# Patient Record
Sex: FEMALE | Race: WHITE
[De-identification: names, ages, dates, MRNs, and addresses within clinical notes are randomized per-mention and may not be internally consistent; named-entity substitution may affect disease eponyms.]

---

## 2017-01-12 ENCOUNTER — HOSPITAL ENCOUNTER (OUTPATIENT)
Dept: HOSPITAL 53 - M LAB | Age: 42
Discharge: HOME | End: 2017-01-12
Attending: FAMILY MEDICINE
Payer: COMMERCIAL

## 2017-01-12 DIAGNOSIS — Z13.220: ICD-10-CM

## 2017-01-12 DIAGNOSIS — Z13.0: Primary | ICD-10-CM

## 2017-01-12 DIAGNOSIS — Z13.29: ICD-10-CM

## 2017-01-12 LAB
ALBUMIN SERPL BCG-MCNC: 4.1 GM/DL (ref 3.2–5.2)
ALBUMIN/GLOB SERPL: 1.37 {RATIO} (ref 1–1.93)
ALP SERPL-CCNC: 96 U/L (ref 45–117)
ALT SERPL W P-5'-P-CCNC: 45 U/L (ref 12–78)
ANION GAP SERPL CALC-SCNC: 6 MEQ/L (ref 8–16)
AST SERPL-CCNC: 22 U/L (ref 15–37)
BASOPHILS # BLD AUTO: 0.1 K/MM3 (ref 0–0.2)
BASOPHILS NFR BLD AUTO: 1.1 % (ref 0–1)
BILIRUB SERPL-MCNC: 0.4 MG/DL (ref 0.2–1)
BUN SERPL-MCNC: 17 MG/DL (ref 7–18)
CALCIUM SERPL-MCNC: 9.1 MG/DL (ref 8.5–10.1)
CHLORIDE SERPL-SCNC: 105 MEQ/L (ref 98–107)
CHOLEST SERPL-MCNC: 318 MG/DL (ref ?–200)
CO2 SERPL-SCNC: 31 MEQ/L (ref 21–32)
CREAT SERPL-MCNC: 0.94 MG/DL (ref 0.55–1.02)
EOSINOPHIL # BLD AUTO: 0.1 K/MM3 (ref 0–0.5)
EOSINOPHIL NFR BLD AUTO: 1.9 % (ref 0–3)
ERYTHROCYTE [DISTWIDTH] IN BLOOD BY AUTOMATED COUNT: 12.8 % (ref 11.5–14.5)
GFR SERPL CREATININE-BSD FRML MDRD: > 60 ML/MIN/{1.73_M2} (ref 58–?)
GLUCOSE SERPL-MCNC: 89 MG/DL (ref 70–105)
LARGE UNSTAINED CELL #: 0.1 K/MM3 (ref 0–0.4)
LARGE UNSTAINED CELL %: 2.2 % (ref 0–4)
LYMPHOCYTES # BLD AUTO: 2.1 K/MM3 (ref 1.5–4.5)
LYMPHOCYTES NFR BLD AUTO: 31.6 % (ref 24–44)
MCH RBC QN AUTO: 30.2 PG (ref 27–33)
MCHC RBC AUTO-ENTMCNC: 33.3 G/DL (ref 32–36.5)
MCV RBC AUTO: 90.5 FL (ref 80–96)
MONOCYTES # BLD AUTO: 0.4 K/MM3 (ref 0–0.8)
MONOCYTES NFR BLD AUTO: 6.3 % (ref 0–5)
NEUTROPHILS # BLD AUTO: 3.5 K/MM3 (ref 1.8–7.7)
NEUTROPHILS NFR BLD AUTO: 56.9 % (ref 36–66)
PLATELET # BLD AUTO: 243 K/MM3 (ref 150–450)
POTASSIUM SERPL-SCNC: 4.7 MEQ/L (ref 3.5–5.1)
PROT SERPL-MCNC: 7.1 GM/DL (ref 6.4–8.2)
SODIUM SERPL-SCNC: 142 MEQ/L (ref 136–145)
T4 FREE SERPL-MCNC: 0.95 NG/DL (ref 0.76–1.46)
TRIGL SERPL-MCNC: 171 MG/DL (ref ?–150)
WBC # BLD AUTO: 6.2 K/MM3 (ref 4–10)

## 2017-02-08 ENCOUNTER — HOSPITAL ENCOUNTER (OUTPATIENT)
Dept: HOSPITAL 53 - M LAB | Age: 42
Discharge: HOME | End: 2017-02-08
Attending: FAMILY MEDICINE
Payer: COMMERCIAL

## 2017-02-08 ENCOUNTER — HOSPITAL ENCOUNTER (OUTPATIENT)
Dept: HOSPITAL 53 - M LAB | Age: 42
Discharge: HOME | End: 2017-02-08
Attending: PODIATRIST
Payer: COMMERCIAL

## 2017-02-08 DIAGNOSIS — M21.629: ICD-10-CM

## 2017-02-08 DIAGNOSIS — E78.2: Primary | ICD-10-CM

## 2017-02-08 DIAGNOSIS — Z01.818: Primary | ICD-10-CM

## 2017-02-08 DIAGNOSIS — R31.9: ICD-10-CM

## 2017-02-08 LAB
ALBUMIN SERPL BCG-MCNC: 4.2 GM/DL (ref 3.2–5.2)
ALBUMIN SERPL BCG-MCNC: 4.2 GM/DL (ref 3.2–5.2)
ALBUMIN/GLOB SERPL: 1.17 {RATIO} (ref 1–1.93)
ALBUMIN/GLOB SERPL: 1.17 {RATIO} (ref 1–1.93)
ALP SERPL-CCNC: 104 U/L (ref 45–117)
ALP SERPL-CCNC: 107 U/L (ref 45–117)
ALT SERPL W P-5'-P-CCNC: 17 U/L (ref 12–78)
ALT SERPL W P-5'-P-CCNC: 18 U/L (ref 12–78)
ANION GAP SERPL CALC-SCNC: 7 MEQ/L (ref 8–16)
ANION GAP SERPL CALC-SCNC: 7 MEQ/L (ref 8–16)
AST SERPL-CCNC: 13 U/L (ref 15–37)
AST SERPL-CCNC: 13 U/L (ref 15–37)
BACTERIA URNS QL MICRO: (no result)
BASOPHILS # BLD AUTO: 0 K/MM3 (ref 0–0.2)
BASOPHILS NFR BLD AUTO: 0.8 % (ref 0–1)
BILIRUB SERPL-MCNC: 0.5 MG/DL (ref 0.2–1)
BILIRUB SERPL-MCNC: 0.5 MG/DL (ref 0.2–1)
BUN SERPL-MCNC: 11 MG/DL (ref 7–18)
BUN SERPL-MCNC: 12 MG/DL (ref 7–18)
CALCIUM SERPL-MCNC: 9.1 MG/DL (ref 8.5–10.1)
CALCIUM SERPL-MCNC: 9.1 MG/DL (ref 8.5–10.1)
CHLORIDE SERPL-SCNC: 103 MEQ/L (ref 98–107)
CHLORIDE SERPL-SCNC: 103 MEQ/L (ref 98–107)
CHOLEST SERPL-MCNC: 211 MG/DL (ref ?–200)
CO2 SERPL-SCNC: 31 MEQ/L (ref 21–32)
CO2 SERPL-SCNC: 31 MEQ/L (ref 21–32)
CREAT SERPL-MCNC: 0.98 MG/DL (ref 0.55–1.02)
CREAT SERPL-MCNC: 0.98 MG/DL (ref 0.55–1.02)
EOSINOPHIL # BLD AUTO: 0.1 K/MM3 (ref 0–0.5)
EOSINOPHIL NFR BLD AUTO: 1.6 % (ref 0–3)
ERYTHROCYTE [DISTWIDTH] IN BLOOD BY AUTOMATED COUNT: 12 % (ref 11.5–14.5)
GFR SERPL CREATININE-BSD FRML MDRD: > 60 ML/MIN/{1.73_M2} (ref 58–?)
GFR SERPL CREATININE-BSD FRML MDRD: > 60 ML/MIN/{1.73_M2} (ref 58–?)
GLUCOSE SERPL-MCNC: 86 MG/DL (ref 70–105)
GLUCOSE SERPL-MCNC: 87 MG/DL (ref 70–105)
HYALINE CASTS URNS QL MICRO: (no result) /LPF (ref 0–1)
LARGE UNSTAINED CELL #: 0.1 K/MM3 (ref 0–0.4)
LARGE UNSTAINED CELL %: 1.8 % (ref 0–4)
LYMPHOCYTES # BLD AUTO: 2 K/MM3 (ref 1.5–4.5)
LYMPHOCYTES NFR BLD AUTO: 35.3 % (ref 24–44)
MCH RBC QN AUTO: 30.5 PG (ref 27–33)
MCHC RBC AUTO-ENTMCNC: 33.9 G/DL (ref 32–36.5)
MCV RBC AUTO: 89.9 FL (ref 80–96)
MICROSCOPIC EXAM: (no result)
MICROSCOPIC INDICATED? MAN: YES
MONOCYTES # BLD AUTO: 0.3 K/MM3 (ref 0–0.8)
MONOCYTES NFR BLD AUTO: 5.5 % (ref 0–5)
NEUTROPHILS # BLD AUTO: 3.1 K/MM3 (ref 1.8–7.7)
NEUTROPHILS NFR BLD AUTO: 54.9 % (ref 36–66)
PLATELET # BLD AUTO: 243 K/MM3 (ref 150–450)
POTASSIUM SERPL-SCNC: 4.3 MEQ/L (ref 3.5–5.1)
POTASSIUM SERPL-SCNC: 4.3 MEQ/L (ref 3.5–5.1)
PROT SERPL-MCNC: 7.8 GM/DL (ref 6.4–8.2)
PROT SERPL-MCNC: 7.8 GM/DL (ref 6.4–8.2)
RBC #/AREA URNS HPF: (no result) /HPF (ref 0–3)
SODIUM SERPL-SCNC: 141 MEQ/L (ref 136–145)
SODIUM SERPL-SCNC: 141 MEQ/L (ref 136–145)
SQUAMOUS URNS QL MICRO: (no result) /HPF
TRIGL SERPL-MCNC: 192 MG/DL (ref ?–150)
WBC # BLD AUTO: 5.6 K/MM3 (ref 4–10)
WBC #/AREA URNS HPF: (no result) /HPF (ref 0–3)

## 2017-02-13 ENCOUNTER — HOSPITAL ENCOUNTER (OUTPATIENT)
Dept: HOSPITAL 53 - M RAD | Age: 42
End: 2017-02-13
Attending: FAMILY MEDICINE
Payer: COMMERCIAL

## 2017-02-13 DIAGNOSIS — Z12.31: Primary | ICD-10-CM

## 2017-02-13 NOTE — REPMRS
Patient History

The patient states she has not had a clinical breast exam in over

a year.

Patient is postmenopausal.

Family history of colorectal cancer in mother at age 65 and 

breast cancer in maternal aunt at age 73.

Prior mammo destroyed

 

Digital Mammo Screening Bilat: February 13, 2017 - Exam #: 

[id number]

Bilateral CC and MLO view(s) were taken.

 

Technologist: Rosa Maria Campbell, Technologist

FINDINGS: There are scattered fibroglandular densities.  There is

no evidence of cancer on this mammogram.

 

ASSESSMENT: BI-RADS/ACR category 2 mammogram. Benign finding(s).

 

Recommendation

Routine screening mammogram of both breasts in 1 year (for women 

over age 40).

This mammogram was interpreted with the aid of an FDA-approved 

computer-aided dectection system.

 

Electronically Signed By: Aftab Colon MD 02/13/17 [id number]

## 2017-02-24 ENCOUNTER — HOSPITAL ENCOUNTER (OUTPATIENT)
Dept: HOSPITAL 53 - M SDC | Age: 42
End: 2017-02-24
Attending: PODIATRIST
Payer: COMMERCIAL

## 2017-02-24 VITALS — HEIGHT: 69 IN | BODY MASS INDEX: 25.09 KG/M2 | WEIGHT: 169.4 LBS

## 2017-02-24 VITALS — SYSTOLIC BLOOD PRESSURE: 109 MMHG | DIASTOLIC BLOOD PRESSURE: 58 MMHG

## 2017-02-24 DIAGNOSIS — I34.1: ICD-10-CM

## 2017-02-24 DIAGNOSIS — M20.12: Primary | ICD-10-CM

## 2017-02-24 DIAGNOSIS — R07.89: ICD-10-CM

## 2017-02-24 DIAGNOSIS — M21.622: ICD-10-CM

## 2017-02-24 DIAGNOSIS — E78.5: ICD-10-CM

## 2017-02-24 DIAGNOSIS — Z88.5: ICD-10-CM

## 2017-02-24 DIAGNOSIS — M79.672: ICD-10-CM

## 2017-02-24 DIAGNOSIS — Z79.899: ICD-10-CM

## 2017-02-24 LAB — CONTROL LINE UCG: (no result)

## 2017-02-24 PROCEDURE — 73630 X-RAY EXAM OF FOOT: CPT

## 2017-02-24 PROCEDURE — 88300 SURGICAL PATH GROSS: CPT

## 2017-02-24 PROCEDURE — 84703 CHORIONIC GONADOTROPIN ASSAY: CPT

## 2017-02-24 PROCEDURE — 85660 RBC SICKLE CELL TEST: CPT

## 2017-02-24 PROCEDURE — 28110 PART REMOVAL OF METATARSAL: CPT

## 2017-02-24 PROCEDURE — 36415 COLL VENOUS BLD VENIPUNCTURE: CPT

## 2017-02-24 PROCEDURE — 28296 COR HLX VLGS DSTL MTAR OSTEO: CPT

## 2017-02-24 NOTE — REP
Clinical:  Status post bunionectomy.

 

Technique:  AP, lateral, oblique views of the left foot.

 

Findings:

The patient is status post bunionectomy.  Open reduction and fixation at the

heads of the first and fifth metatarsal bones noted.  Satisfactory alignment

suggested.

 

Impression:

Postoperative changes involving the first and fifth metatarsal bones.

 

 

Signed by

David Hannah MD 02/24/2017 02:09 P

## 2017-05-21 ENCOUNTER — HOSPITAL ENCOUNTER (EMERGENCY)
Dept: HOSPITAL 53 - M ED | Age: 42
Discharge: HOME | End: 2017-05-21
Payer: COMMERCIAL

## 2017-05-21 VITALS — WEIGHT: 170 LBS | BODY MASS INDEX: 25.18 KG/M2 | HEIGHT: 69 IN

## 2017-05-21 VITALS — SYSTOLIC BLOOD PRESSURE: 105 MMHG | DIASTOLIC BLOOD PRESSURE: 58 MMHG

## 2017-05-21 DIAGNOSIS — S90.32XA: ICD-10-CM

## 2017-05-21 DIAGNOSIS — I47.1: ICD-10-CM

## 2017-05-21 DIAGNOSIS — R55: Primary | ICD-10-CM

## 2017-05-21 DIAGNOSIS — E78.00: ICD-10-CM
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
## 2017-05-21 DIAGNOSIS — I34.1: ICD-10-CM

## 2017-05-21 DIAGNOSIS — I10: ICD-10-CM

## 2017-05-21 LAB
ANION GAP SERPL CALC-SCNC: 5 MEQ/L (ref 8–16)
BASOPHILS # BLD AUTO: 0 K/MM3 (ref 0–0.2)
BASOPHILS NFR BLD AUTO: 0.5 % (ref 0–1)
BUN SERPL-MCNC: 11 MG/DL (ref 7–18)
CALCIUM SERPL-MCNC: 8.9 MG/DL (ref 8.5–10.1)
CHLORIDE SERPL-SCNC: 105 MEQ/L (ref 98–107)
CO2 SERPL-SCNC: 32 MEQ/L (ref 21–32)
CREAT SERPL-MCNC: 1 MG/DL (ref 0.55–1.02)
EOSINOPHIL # BLD AUTO: 0.1 K/MM3 (ref 0–0.5)
EOSINOPHIL NFR BLD AUTO: 1.7 % (ref 0–3)
ERYTHROCYTE [DISTWIDTH] IN BLOOD BY AUTOMATED COUNT: 12.3 % (ref 11.5–14.5)
GFR SERPL CREATININE-BSD FRML MDRD: > 60 ML/MIN/{1.73_M2} (ref 58–?)
GLUCOSE SERPL-MCNC: 92 MG/DL (ref 70–105)
LARGE UNSTAINED CELL #: 0.1 K/MM3 (ref 0–0.4)
LARGE UNSTAINED CELL %: 1.5 % (ref 0–4)
LYMPHOCYTES # BLD AUTO: 1.7 K/MM3 (ref 1.5–4.5)
LYMPHOCYTES NFR BLD AUTO: 23.6 % (ref 24–44)
MAGNESIUM SERPL-MCNC: 2.4 MG/DL (ref 1.8–2.4)
MCH RBC QN AUTO: 31.2 PG (ref 27–33)
MCHC RBC AUTO-ENTMCNC: 34.5 G/DL (ref 32–36.5)
MCV RBC AUTO: 90.2 FL (ref 80–96)
METHADONE UR QL SCN: NEGATIVE
MONOCYTES # BLD AUTO: 0.3 K/MM3 (ref 0–0.8)
MONOCYTES NFR BLD AUTO: 4.3 % (ref 0–5)
NEUTROPHILS # BLD AUTO: 4.7 K/MM3 (ref 1.8–7.7)
NEUTROPHILS NFR BLD AUTO: 68.4 % (ref 36–66)
PLATELET # BLD AUTO: 257 K/MM3 (ref 150–450)
POTASSIUM SERPL-SCNC: 3.8 MEQ/L (ref 3.5–5.1)
SODIUM SERPL-SCNC: 142 MEQ/L (ref 136–145)
WBC # BLD AUTO: 6.8 K/MM3 (ref 4–10)

## 2017-05-21 PROCEDURE — 82550 ASSAY OF CK (CPK): CPT

## 2017-05-21 PROCEDURE — 85025 COMPLETE CBC W/AUTO DIFF WBC: CPT

## 2017-05-21 PROCEDURE — 70450 CT HEAD/BRAIN W/O DYE: CPT

## 2017-05-21 PROCEDURE — 94760 N-INVAS EAR/PLS OXIMETRY 1: CPT

## 2017-05-21 PROCEDURE — 84443 ASSAY THYROID STIM HORMONE: CPT

## 2017-05-21 PROCEDURE — 99285 EMERGENCY DEPT VISIT HI MDM: CPT

## 2017-05-21 PROCEDURE — 93041 RHYTHM ECG TRACING: CPT

## 2017-05-21 PROCEDURE — 93005 ELECTROCARDIOGRAM TRACING: CPT

## 2017-05-21 PROCEDURE — 80306 DRUG TEST PRSMV INSTRMNT: CPT

## 2017-05-21 PROCEDURE — 81001 URINALYSIS AUTO W/SCOPE: CPT

## 2017-05-21 PROCEDURE — 83735 ASSAY OF MAGNESIUM: CPT

## 2017-05-21 PROCEDURE — 82553 CREATINE MB FRACTION: CPT

## 2017-05-21 PROCEDURE — 71010: CPT

## 2017-05-21 PROCEDURE — 73630 X-RAY EXAM OF FOOT: CPT

## 2017-05-21 PROCEDURE — 80048 BASIC METABOLIC PNL TOTAL CA: CPT

## 2017-05-21 NOTE — REP
Clinical:  Syncope .

 

Comparison: None .

 

Findings:

The ventricles, sulci, and cisterns are normal in position and appearance.

Gray-white differentiation is maintained.  No acute intracranial hemorrhage,

mass/mass effect, pathology or trauma/injury.  No evidence for acute infarction.

No extra-axial fluid collection.  Calvarium is intact.  Paranasal sinuses and

mastoid air cells are clear.

 

Impression:

 

No evidence for acute intracranial pathology or trauma/injury.

 

 

Signed by

David Hannah MD 05/21/2017 02:21 P

## 2017-05-21 NOTE — REP
Clinical:  Trauma.

 

Technique:  AP, lateral, bilateral oblique views of the left foot.

 

Comparison:  02/24/2017.

 

Findings:

The patient is again noted to be status post podiatric surgery involving the

first and fifth metatarsal bones.  Underlying osteopenia and degenerative changes

are appreciated.  No obvious acute fracture dislocation noted.

 

Impression:

No acute fracture dislocation identified.

 

 

Signed by

David Hannah MD 05/21/2017 02:39 P

## 2017-05-21 NOTE — ECGEPIP
Stationary ECG Study

                           [facility] - ED

                                       

                                       Test Date:    2017

Pat Name:     KENNETH HILL           Department:   

Patient ID:   [id number]                 Room:         -

Gender:       F                        Technician:   rn

:          1975               Requested By: Carlos JACKSON

Order Number: [id number]     Reading MD:   Keturah De Paz

                                 Measurements

Intervals                              Axis          

Rate:         70                       P:            66

MI:           153                      QRS:          15

QRSD:         81                       T:            24

QT:           398                                    

QTc:          430                                    

                           Interpretive Statements

SINUS RHYTHM

NSTTW ABNORMALITY

SIMILAR 17

Electronically Signed On 2017 20:08:38 EDT by Keturah De Paz

## 2017-05-21 NOTE — REP
Clinical:  Syncope .

 

Comparison: 10/01/2015 .

 

Technique:  PA.

 

Findings:

The mediastinum and cardiac silhouette are normal.  The lung fields are clear and

without acute consolidation, effusion, or pneumothorax.  The skeletal structures

are intact and normal.

 

Impression:

1.   No acute cardiopulmonary process.

 

 

Signed by

David Hannah MD 05/21/2017 02:40 P

## 2017-10-20 ENCOUNTER — HOSPITAL ENCOUNTER (OUTPATIENT)
Dept: HOSPITAL 53 - M RAD | Age: 42
End: 2017-10-20
Attending: PHYSICIAN ASSISTANT
Payer: COMMERCIAL

## 2017-10-20 DIAGNOSIS — J32.8: Primary | ICD-10-CM

## 2017-10-20 DIAGNOSIS — J34.2: ICD-10-CM

## 2017-10-20 NOTE — REP
MAXILLOFACIAL CT WITHOUT CONTRAST:

 

HISTORY:  Chronic sinusitis.

 

The left frontal sinus is hypoplastic. The sinuses are clear. The osteomeatal

units are patent. The middle and inferior nasal turbinates are partially

paradoxical. There is minimal deviation of the nasal septum to the left. A spur

is present arising from the left side of the nasal septum. The spur abuts the

left inferior nasal turbinate. The cribriform plate, medial walls of the orbits,

and optic canals are intact. The carotid canals form a segment of the

posterolateral walls of the sphenoid sinus. The left sphenoid sinus septum

inserts into the left internal carotid canal wall.

 

IMPRESSION:

 

There is no acute or chronic sinusitis.

 

 

Signed by

Otis Olivares MD 10/20/2017 10:30 A

## 2017-12-08 ENCOUNTER — HOSPITAL ENCOUNTER (OUTPATIENT)
Dept: HOSPITAL 53 - M SMT | Age: 42
End: 2017-12-08
Attending: PHYSICIAN ASSISTANT
Payer: COMMERCIAL

## 2017-12-08 DIAGNOSIS — Z01.818: Primary | ICD-10-CM

## 2017-12-08 LAB
ALBUMIN SERPL BCG-MCNC: 4.3 GM/DL (ref 3.2–5.2)
ALBUMIN/GLOB SERPL: 1.08 {RATIO} (ref 1–1.93)
ALP SERPL-CCNC: 94 U/L (ref 45–117)
ALT SERPL W P-5'-P-CCNC: 39 U/L (ref 12–78)
ANION GAP SERPL CALC-SCNC: 9 MEQ/L (ref 8–16)
AST SERPL-CCNC: 19 U/L (ref 7–37)
BASOPHILS # BLD AUTO: 0.1 10^3/UL (ref 0–0.2)
BASOPHILS NFR BLD AUTO: 0.7 % (ref 0–1)
BILIRUB SERPL-MCNC: 0.5 MG/DL (ref 0.2–1)
BUN SERPL-MCNC: 14 MG/DL (ref 7–18)
CALCIUM SERPL-MCNC: 9.7 MG/DL (ref 8.5–10.1)
CHLORIDE SERPL-SCNC: 102 MEQ/L (ref 98–107)
CO2 SERPL-SCNC: 30 MEQ/L (ref 21–32)
CREAT SERPL-MCNC: 0.96 MG/DL (ref 0.55–1.02)
EOSINOPHIL # BLD AUTO: 0.1 10^3/UL (ref 0–0.5)
EOSINOPHIL NFR BLD AUTO: 1.5 % (ref 0–3)
ERYTHROCYTE [DISTWIDTH] IN BLOOD BY AUTOMATED COUNT: 11.9 % (ref 11.5–14.5)
GFR SERPL CREATININE-BSD FRML MDRD: > 60 ML/MIN/{1.73_M2} (ref 58–?)
GLUCOSE SERPL-MCNC: 103 MG/DL (ref 70–105)
IMM GRANULOCYTES NFR BLD: 0.3 % (ref 0–0)
INR PPP: 0.98
LYMPHOCYTES # BLD AUTO: 2.2 10^3/UL (ref 1.5–4.5)
LYMPHOCYTES NFR BLD AUTO: 33 % (ref 24–44)
MCH RBC QN AUTO: 30.3 PG (ref 27–33)
MCHC RBC AUTO-ENTMCNC: 33.1 G/DL (ref 32–36.5)
MCV RBC AUTO: 91.6 FL (ref 80–96)
MONOCYTES # BLD AUTO: 0.5 10^3/UL (ref 0–0.8)
MONOCYTES NFR BLD AUTO: 7.9 % (ref 0–5)
NEUTROPHILS # BLD AUTO: 3.8 10^3/UL (ref 1.8–7.7)
NEUTROPHILS NFR BLD AUTO: 56.6 % (ref 36–66)
NRBC BLD AUTO-RTO: 0 % (ref 0–0)
PLATELET # BLD AUTO: 314 10^3/UL (ref 150–450)
POTASSIUM SERPL-SCNC: 4.3 MEQ/L (ref 3.5–5.1)
PROT SERPL-MCNC: 8.3 GM/DL (ref 6.4–8.2)
SODIUM SERPL-SCNC: 141 MEQ/L (ref 136–145)
WBC # BLD AUTO: 6.7 10^3/UL (ref 4–10)

## 2017-12-08 NOTE — REP
PA and lateral chest:

 

Comparison is 03/05/2009.

 

Lung fields are clear.  Cardiac size is normal.  The sherman and mediastinum are

unremarkable.

 

There is scoliosis convex left at the thoracolumbar junction, unchanged.

 

There are surgical clips in the upper abdomen.

 

There is pectus extra bottom.  This is unchanged.

 

Impression:  No significant interval change.  Pectus excavatum.  Otherwise,

essentially negative PA and lateral chest.

 

 

Signed by

Aftab Ashton MD 12/08/2017 10:42 A

## 2018-02-14 ENCOUNTER — HOSPITAL ENCOUNTER (EMERGENCY)
Dept: HOSPITAL 53 - M ED | Age: 43
Discharge: HOME | End: 2018-02-14
Payer: COMMERCIAL

## 2018-02-14 ENCOUNTER — HOSPITAL ENCOUNTER (OUTPATIENT)
Dept: HOSPITAL 53 - M SFHCLERA | Age: 43
End: 2018-02-14
Attending: NURSE PRACTITIONER
Payer: COMMERCIAL

## 2018-02-14 DIAGNOSIS — Z88.5: ICD-10-CM

## 2018-02-14 DIAGNOSIS — R11.0: Primary | ICD-10-CM

## 2018-02-14 DIAGNOSIS — Z98.890: ICD-10-CM

## 2018-02-14 DIAGNOSIS — K52.9: Primary | ICD-10-CM

## 2018-02-14 DIAGNOSIS — I88.0: ICD-10-CM

## 2018-02-14 DIAGNOSIS — Z86.79: ICD-10-CM

## 2018-02-14 DIAGNOSIS — F41.9: ICD-10-CM

## 2018-02-14 DIAGNOSIS — Z79.899: ICD-10-CM

## 2018-02-14 LAB
ANION GAP: 8 MEQ/L (ref 8–16)
BASO #: 0 10^3/UL (ref 0–0.2)
BASO %: 0.3 % (ref 0–1)
BLOOD UREA NITROGEN: 22 MG/DL (ref 7–18)
CALCIUM LEVEL: 9.5 MG/DL (ref 8.5–10.1)
CARBON DIOXIDE LEVEL: 29 MEQ/L (ref 21–32)
CHLORIDE LEVEL: 103 MEQ/L (ref 98–107)
CONTROL LINE UCG: (no result)
CREATININE FOR GFR: 0.93 MG/DL (ref 0.55–1.3)
CRP SERPL-MCNC: 1.71 MG/DL (ref 0–0.3)
EOS #: 0 10^3/UL (ref 0–0.5)
EOSINOPHIL NFR BLD AUTO: 0.3 % (ref 0–3)
GFR SERPL CREATININE-BSD FRML MDRD: > 60 ML/MIN/{1.73_M2} (ref 58–?)
GLUCOSE, FASTING: 95 MG/DL (ref 70–100)
HEMATOCRIT: 43.1 % (ref 36–47)
HEMOGLOBIN: 14.7 G/DL (ref 12–16)
IMMATURE GRANULOCYTE %: 0.2 % (ref 0–3)
LEUKOCYTE ESTERASE UR AUTO RFX: (no result)
LYMPH #: 1.1 10^3/UL (ref 1.5–4.5)
LYMPH %: 12.2 % (ref 24–44)
MEAN CORPUSCULAR HEMOGLOBIN: 30.7 PG (ref 27–33)
MEAN CORPUSCULAR HGB CONC: 34.1 G/DL (ref 32–36.5)
MEAN CORPUSCULAR VOLUME: 90 FL (ref 80–96)
MONO #: 0.4 10^3/UL (ref 0–0.8)
MONO %: 4.6 % (ref 0–5)
NEUTROPHILS #: 7.1 10^3/UL (ref 1.8–7.7)
NEUTROPHILS %: 82.4 % (ref 36–66)
NRBC BLD AUTO-RTO: 0 % (ref 0–0)
PLATELET COUNT, AUTOMATED: 260 10^3/UL (ref 150–450)
POTASSIUM SERUM: 4.5 MEQ/L (ref 3.5–5.1)
RED BLOOD COUNT: 4.79 10^6/UL (ref 4–5.4)
RED CELL DISTRIBUTION WIDTH: 12 % (ref 11.5–14.5)
SODIUM LEVEL: 140 MEQ/L (ref 136–145)
SPECIFIC GRAVITY UR AUTO RFX: 1.03 (ref 1–1.03)
SQUAM EPITHELIAL CELL UR AURFX: 1 /HPF (ref 0–6)
URINE PREG TEST: NEGATIVE
WHITE BLOOD COUNT: 8.6 10^3/UL (ref 4–10)

## 2018-02-14 RX ADMIN — ONDANSETRON 1 MG: 2 INJECTION INTRAMUSCULAR; INTRAVENOUS at 21:31

## 2018-02-14 RX ADMIN — KETOROLAC TROMETHAMINE 1 MG: 30 INJECTION, SOLUTION INTRAMUSCULAR at 21:31

## 2018-02-14 RX ADMIN — CIPROFLOXACIN HYDROCHLORIDE 1 MG: 500 TABLET, FILM COATED ORAL at 23:30

## 2018-02-14 RX ADMIN — METRONIDAZOLE 1 MG: 500 TABLET ORAL at 23:30

## 2018-02-26 ENCOUNTER — HOSPITAL ENCOUNTER (OUTPATIENT)
Dept: HOSPITAL 53 - M LAB REF | Age: 43
End: 2018-02-26
Attending: FAMILY MEDICINE
Payer: COMMERCIAL

## 2018-02-26 DIAGNOSIS — R50.9: Primary | ICD-10-CM

## 2018-02-26 LAB
FLUAV RNA UPPER RESP QL NAA+PROBE: NEGATIVE
INFLUENZA B AMPLIFICATION: NEGATIVE

## 2018-02-26 PROCEDURE — 87502 INFLUENZA DNA AMP PROBE: CPT

## 2018-03-22 ENCOUNTER — HOSPITAL ENCOUNTER (OUTPATIENT)
Dept: HOSPITAL 53 - M LAB REF | Age: 43
End: 2018-03-22
Attending: PHYSICIAN ASSISTANT
Payer: COMMERCIAL

## 2018-03-22 DIAGNOSIS — Z00.01: Primary | ICD-10-CM

## 2018-03-22 PROCEDURE — 87591 N.GONORRHOEAE DNA AMP PROB: CPT

## 2018-03-26 LAB
CHLAMYDIA DNA AMPLIFICATION: NEGATIVE
GC DNA AMPLIFICATION: NEGATIVE
HPV LOW VOL RFLX: (no result)

## 2018-04-14 ENCOUNTER — HOSPITAL ENCOUNTER (OUTPATIENT)
Dept: HOSPITAL 53 - M LAB REF | Age: 43
End: 2018-04-14
Attending: INTERNAL MEDICINE
Payer: COMMERCIAL

## 2018-04-14 DIAGNOSIS — R19.7: Primary | ICD-10-CM

## 2018-04-14 PROCEDURE — 87177 OVA AND PARASITES SMEARS: CPT

## 2018-04-16 ENCOUNTER — HOSPITAL ENCOUNTER (OUTPATIENT)
Dept: HOSPITAL 53 - M LAB | Age: 43
End: 2018-04-16
Attending: INTERNAL MEDICINE
Payer: COMMERCIAL

## 2018-04-16 DIAGNOSIS — R19.7: Primary | ICD-10-CM

## 2018-04-16 PROCEDURE — 82784 ASSAY IGA/IGD/IGG/IGM EACH: CPT

## 2018-04-27 ENCOUNTER — HOSPITAL ENCOUNTER (OUTPATIENT)
Dept: HOSPITAL 53 - M OPP | Age: 43
Discharge: HOME | End: 2018-04-27
Attending: INTERNAL MEDICINE
Payer: COMMERCIAL

## 2018-04-27 DIAGNOSIS — R19.7: ICD-10-CM

## 2018-04-27 DIAGNOSIS — K64.8: ICD-10-CM

## 2018-04-27 DIAGNOSIS — K52.9: ICD-10-CM

## 2018-04-27 DIAGNOSIS — R07.89: ICD-10-CM

## 2018-04-27 DIAGNOSIS — Z88.5: ICD-10-CM

## 2018-04-27 DIAGNOSIS — Z86.79: ICD-10-CM

## 2018-04-27 DIAGNOSIS — Z79.899: ICD-10-CM

## 2018-04-27 DIAGNOSIS — I34.1: ICD-10-CM

## 2018-04-27 DIAGNOSIS — Z80.3: ICD-10-CM

## 2018-04-27 DIAGNOSIS — Z80.0: ICD-10-CM

## 2018-04-27 DIAGNOSIS — K92.1: ICD-10-CM

## 2018-04-27 DIAGNOSIS — G43.909: ICD-10-CM

## 2018-04-27 DIAGNOSIS — D64.9: ICD-10-CM

## 2018-04-27 DIAGNOSIS — K62.1: ICD-10-CM

## 2018-04-27 DIAGNOSIS — K63.89: ICD-10-CM

## 2018-04-27 DIAGNOSIS — K21.9: ICD-10-CM

## 2018-04-27 DIAGNOSIS — R93.3: Primary | ICD-10-CM

## 2018-04-27 DIAGNOSIS — R00.8: ICD-10-CM

## 2018-04-27 DIAGNOSIS — R06.02: ICD-10-CM

## 2018-04-27 PROCEDURE — 45380 COLONOSCOPY AND BIOPSY: CPT

## 2018-04-27 RX ADMIN — SODIUM CHLORIDE 1 MLS/HR: 9 INJECTION, SOLUTION INTRAVENOUS at 08:15

## 2018-06-19 ENCOUNTER — HOSPITAL ENCOUNTER (OUTPATIENT)
Dept: HOSPITAL 53 - M SFHCLERA | Age: 43
End: 2018-06-19
Attending: NURSE PRACTITIONER
Payer: COMMERCIAL

## 2018-06-19 DIAGNOSIS — J02.9: Primary | ICD-10-CM

## 2018-06-19 PROCEDURE — 87880 STREP A ASSAY W/OPTIC: CPT

## 2018-07-03 ENCOUNTER — HOSPITAL ENCOUNTER (OUTPATIENT)
Dept: HOSPITAL 53 - M LRY | Age: 43
End: 2018-07-03
Attending: PHYSICIAN ASSISTANT
Payer: COMMERCIAL

## 2018-07-03 DIAGNOSIS — R05: Primary | ICD-10-CM

## 2018-07-03 PROCEDURE — 71046 X-RAY EXAM CHEST 2 VIEWS: CPT

## 2019-11-23 ENCOUNTER — HOSPITAL ENCOUNTER (EMERGENCY)
Dept: HOSPITAL 53 - M ED | Age: 44
Discharge: HOME | End: 2019-11-23
Payer: COMMERCIAL

## 2019-11-23 VITALS — SYSTOLIC BLOOD PRESSURE: 109 MMHG | DIASTOLIC BLOOD PRESSURE: 66 MMHG

## 2019-11-23 VITALS — BODY MASS INDEX: 26.72 KG/M2 | HEIGHT: 69 IN | WEIGHT: 180.38 LBS

## 2019-11-23 DIAGNOSIS — Z79.51: ICD-10-CM

## 2019-11-23 DIAGNOSIS — Z86.79: ICD-10-CM

## 2019-11-23 DIAGNOSIS — E78.5: ICD-10-CM

## 2019-11-23 DIAGNOSIS — I10: ICD-10-CM

## 2019-11-23 DIAGNOSIS — R07.89: Primary | ICD-10-CM

## 2019-11-23 DIAGNOSIS — Z88.5: ICD-10-CM

## 2019-11-23 DIAGNOSIS — R94.31: ICD-10-CM

## 2019-11-23 DIAGNOSIS — Z79.899: ICD-10-CM

## 2019-11-23 LAB
ALBUMIN SERPL BCG-MCNC: 4.1 GM/DL (ref 3.2–5.2)
ALT SERPL W P-5'-P-CCNC: 22 U/L (ref 12–78)
BASOPHILS # BLD AUTO: 0 10^3/UL (ref 0–0.2)
BASOPHILS NFR BLD AUTO: 0.7 % (ref 0–1)
BILIRUB CONJ SERPL-MCNC: 0.1 MG/DL (ref 0–0.2)
BILIRUB SERPL-MCNC: 0.6 MG/DL (ref 0.2–1)
BUN SERPL-MCNC: 9 MG/DL (ref 7–18)
CALCIUM SERPL-MCNC: 9.3 MG/DL (ref 8.5–10.1)
CHLORIDE SERPL-SCNC: 108 MEQ/L (ref 98–107)
CK MB CFR.DF SERPL CALC: 1.08
CK MB SERPL-MCNC: < 1 NG/ML (ref ?–3.6)
CK SERPL-CCNC: 93 U/L (ref 26–192)
CO2 SERPL-SCNC: 25 MEQ/L (ref 21–32)
CREAT SERPL-MCNC: 1.12 MG/DL (ref 0.55–1.3)
EOSINOPHIL # BLD AUTO: 0.1 10^3/UL (ref 0–0.5)
EOSINOPHIL NFR BLD AUTO: 0.9 % (ref 0–3)
FLUAV RNA UPPER RESP QL NAA+PROBE: NEGATIVE
FLUBV RNA UPPER RESP QL NAA+PROBE: NEGATIVE
GFR SERPL CREATININE-BSD FRML MDRD: 56.3 ML/MIN/{1.73_M2} (ref 58–?)
GLUCOSE SERPL-MCNC: 98 MG/DL (ref 70–100)
HCT VFR BLD AUTO: 42.1 % (ref 36–47)
HGB BLD-MCNC: 13.9 G/DL (ref 12–15.5)
LIPASE SERPL-CCNC: 122 U/L (ref 73–393)
LYMPHOCYTES # BLD AUTO: 1.4 10^3/UL (ref 1.5–5)
LYMPHOCYTES NFR BLD AUTO: 25.3 % (ref 24–44)
MCH RBC QN AUTO: 30.5 PG (ref 27–33)
MCHC RBC AUTO-ENTMCNC: 33 G/DL (ref 32–36.5)
MCV RBC AUTO: 92.5 FL (ref 80–96)
MONOCYTES # BLD AUTO: 0.7 10^3/UL (ref 0–0.8)
MONOCYTES NFR BLD AUTO: 11.7 % (ref 0–5)
NEUTROPHILS # BLD AUTO: 3.4 10^3/UL (ref 1.5–8.5)
NEUTROPHILS NFR BLD AUTO: 61 % (ref 36–66)
PLATELET # BLD AUTO: 243 10^3/UL (ref 150–450)
POTASSIUM SERPL-SCNC: 3.8 MEQ/L (ref 3.5–5.1)
PROT SERPL-MCNC: 7.3 GM/DL (ref 6.4–8.2)
RBC # BLD AUTO: 4.55 10^6/UL (ref 4–5.4)
SODIUM SERPL-SCNC: 141 MEQ/L (ref 136–145)
TROPONIN I SERPL-MCNC: < 0.02 NG/ML (ref ?–0.1)
WBC # BLD AUTO: 5.6 10^3/UL (ref 4–10)

## 2019-11-24 NOTE — ED PDOC
Post-Departure Follow-Up


dr leo carrizales faxed foraml report of cxr for fu lurdesg











Lundborg-Gray,Maja MD          Nov 24, 2019 19:54

## 2019-11-24 NOTE — REP
REASON FOR EXAM:  Chest pain.

 

COMPARISON:  Multiple, the latest 07/03/2018.

 

The technique utilized in obtaining the radiograph has magnified the cardiac

silhouette and accentuated the interstitial markings.

 

There is mild cardiomegaly accentuated by technique.  The lung fields are clear

and the pleural angles are sharp.  The osseous structures are within normal

limits.

 

IMPRESSION:

 

Mild  cardiomegaly accentuated by technique.

 

 

Electronically Signed by

Yovanny Morris DO 11/24/2019 09:22 A

## 2019-11-24 NOTE — ECGEPIP
[facility] - ED

                                       

                                       Test Date:    2019

Pat Name:     KENNETH HILL           Department:   

Patient ID:   [id number]                 Room:         -

Gender:       Female                   Technician:   JEMIMA

:          1975               Requested By: Carlos JACKSON

Order Number: [id number]     Reading MD:   Maja Lundborg-Gray

                                 Measurements

Intervals                              Axis          

Rate:         65                       P:            66

MA:           150                      QRS:          10

QRSD:         88                       T:            36

QT:           405                                    

QTc:          424                                    

                           Interpretive Statements

SINUS RHYTHM

NONSPECIFIC ST T WAVE CHANGES

 

CW 17 RATE DECREASED

NONSPECIFIC ST T WAVE CHANGES

Electronically Signed on 2019 19:11:42 EST by Maja Lundborg-Gray

## 2019-12-01 ENCOUNTER — HOSPITAL ENCOUNTER (OUTPATIENT)
Dept: HOSPITAL 53 - M LRY | Age: 44
End: 2019-12-01
Attending: PHYSICIAN ASSISTANT
Payer: COMMERCIAL

## 2019-12-01 DIAGNOSIS — R50.9: ICD-10-CM

## 2019-12-01 DIAGNOSIS — R05: Primary | ICD-10-CM

## 2019-12-01 PROCEDURE — 94640 AIRWAY INHALATION TREATMENT: CPT

## 2019-12-01 PROCEDURE — 71046 X-RAY EXAM CHEST 2 VIEWS: CPT

## 2019-12-01 NOTE — REP
Clinical:  Cough and fever .

 

Comparison: 12/08/2017 .

 

Technique:  PA and lateral.

 

Findings:

The mediastinum and cardiac silhouette are normal.  The lung fields are clear and

without acute consolidation, effusion, or pneumothorax.  The skeletal structures

are intact and normal.

 

Impression:

1.   No acute cardiopulmonary process.

 

 

Electronically Signed by

David Hannah MD 12/01/2019 01:50 P

## 2020-04-30 ENCOUNTER — HOSPITAL ENCOUNTER (OUTPATIENT)
Dept: HOSPITAL 53 - M WUC | Age: 45
End: 2020-04-30
Attending: PHYSICIAN ASSISTANT
Payer: COMMERCIAL

## 2020-04-30 DIAGNOSIS — R10.9: Primary | ICD-10-CM

## 2020-04-30 LAB
ALBUMIN SERPL BCG-MCNC: 4.4 GM/DL (ref 3.2–5.2)
ALT SERPL W P-5'-P-CCNC: 25 U/L (ref 12–78)
BASOPHILS # BLD AUTO: 0 10^3/UL (ref 0–0.2)
BASOPHILS NFR BLD AUTO: 0.5 % (ref 0–1)
BILIRUB SERPL-MCNC: 0.6 MG/DL (ref 0.2–1)
BUN SERPL-MCNC: 12 MG/DL (ref 7–18)
CALCIUM SERPL-MCNC: 9.3 MG/DL (ref 8.5–10.1)
CHLORIDE SERPL-SCNC: 106 MEQ/L (ref 98–107)
CO2 SERPL-SCNC: 31 MEQ/L (ref 21–32)
CREAT SERPL-MCNC: 1.02 MG/DL (ref 0.55–1.3)
EOSINOPHIL # BLD AUTO: 0.1 10^3/UL (ref 0–0.5)
EOSINOPHIL NFR BLD AUTO: 1.1 % (ref 0–3)
GFR SERPL CREATININE-BSD FRML MDRD: > 60 ML/MIN/{1.73_M2} (ref 58–?)
GLUCOSE SERPL-MCNC: 83 MG/DL (ref 70–100)
HCT VFR BLD AUTO: 44.3 % (ref 36–47)
HGB BLD-MCNC: 14.4 G/DL (ref 12–15.5)
LIPASE SERPL-CCNC: 139 U/L (ref 73–393)
LYMPHOCYTES # BLD AUTO: 3.5 10^3/UL (ref 1.5–5)
LYMPHOCYTES NFR BLD AUTO: 46.2 % (ref 24–44)
MCH RBC QN AUTO: 29.9 PG (ref 27–33)
MCHC RBC AUTO-ENTMCNC: 32.5 G/DL (ref 32–36.5)
MCV RBC AUTO: 92.1 FL (ref 80–96)
MONOCYTES # BLD AUTO: 0.6 10^3/UL (ref 0–0.8)
MONOCYTES NFR BLD AUTO: 7.5 % (ref 0–5)
NEUTROPHILS # BLD AUTO: 3.3 10^3/UL (ref 1.5–8.5)
NEUTROPHILS NFR BLD AUTO: 44.4 % (ref 36–66)
PLATELET # BLD AUTO: 296 10^3/UL (ref 150–450)
POTASSIUM SERPL-SCNC: 4 MEQ/L (ref 3.5–5.1)
PROT SERPL-MCNC: 7.6 GM/DL (ref 6.4–8.2)
RBC # BLD AUTO: 4.81 10^6/UL (ref 4–5.4)
SODIUM SERPL-SCNC: 141 MEQ/L (ref 136–145)
WBC # BLD AUTO: 7.5 10^3/UL (ref 4–10)

## 2021-04-08 ENCOUNTER — HOSPITAL ENCOUNTER (OUTPATIENT)
Dept: HOSPITAL 53 - M PLALAB | Age: 46
End: 2021-04-08
Attending: PHYSICIAN ASSISTANT
Payer: COMMERCIAL

## 2021-04-08 DIAGNOSIS — I47.1: Primary | ICD-10-CM

## 2021-04-08 DIAGNOSIS — E78.00: ICD-10-CM

## 2021-04-08 DIAGNOSIS — E87.6: ICD-10-CM

## 2021-04-08 LAB
ALBUMIN SERPL BCG-MCNC: 4.1 GM/DL (ref 3.2–5.2)
ALT SERPL W P-5'-P-CCNC: 40 U/L (ref 12–78)
BILIRUB SERPL-MCNC: 0.5 MG/DL (ref 0.2–1)
BUN SERPL-MCNC: 19 MG/DL (ref 7–18)
CALCIUM SERPL-MCNC: 9.9 MG/DL (ref 8.5–10.1)
CHLORIDE SERPL-SCNC: 107 MEQ/L (ref 98–107)
CHOLEST SERPL-MCNC: 364 MG/DL (ref ?–200)
CHOLEST/HDLC SERPL: 7.74 {RATIO} (ref ?–5)
CO2 SERPL-SCNC: 30 MEQ/L (ref 21–32)
CREAT SERPL-MCNC: 0.98 MG/DL (ref 0.55–1.3)
GFR SERPL CREATININE-BSD FRML MDRD: > 60 ML/MIN/{1.73_M2} (ref 58–?)
GLUCOSE SERPL-MCNC: 96 MG/DL (ref 70–100)
HDLC SERPL-MCNC: 47 MG/DL (ref 40–?)
LDLC SERPL CALC-MCNC: 281 MG/DL (ref ?–100)
MAGNESIUM SERPL-MCNC: 2.3 MG/DL (ref 1.8–2.4)
NONHDLC SERPL-MCNC: 317 MG/DL
POTASSIUM SERPL-SCNC: 4.5 MEQ/L (ref 3.5–5.1)
PROT SERPL-MCNC: 7.3 GM/DL (ref 6.4–8.2)
SODIUM SERPL-SCNC: 140 MEQ/L (ref 136–145)
TRIGL SERPL-MCNC: 179 MG/DL (ref ?–150)

## 2021-04-24 ENCOUNTER — HOSPITAL ENCOUNTER (OUTPATIENT)
Dept: HOSPITAL 53 - M LAB REF | Age: 46
End: 2021-04-24
Attending: PHYSICIAN ASSISTANT
Payer: COMMERCIAL

## 2021-04-24 ENCOUNTER — HOSPITAL ENCOUNTER (OUTPATIENT)
Dept: HOSPITAL 53 - M LAB | Age: 46
End: 2021-04-24
Attending: PHYSICIAN ASSISTANT
Payer: COMMERCIAL

## 2021-04-24 DIAGNOSIS — R10.32: Primary | ICD-10-CM

## 2021-04-24 DIAGNOSIS — R10.9: Primary | ICD-10-CM

## 2021-04-24 LAB
ALBUMIN SERPL BCG-MCNC: 4.1 GM/DL (ref 3.2–5.2)
ALT SERPL W P-5'-P-CCNC: 22 U/L (ref 12–78)
BASOPHILS # BLD AUTO: 0 10^3/UL (ref 0–0.2)
BASOPHILS NFR BLD AUTO: 0.6 % (ref 0–1)
BILIRUB SERPL-MCNC: 0.4 MG/DL (ref 0.2–1)
BUN SERPL-MCNC: 18 MG/DL (ref 7–18)
CALCIUM SERPL-MCNC: 9.4 MG/DL (ref 8.5–10.1)
CHLORIDE SERPL-SCNC: 107 MEQ/L (ref 98–107)
CO2 SERPL-SCNC: 30 MEQ/L (ref 21–32)
CREAT SERPL-MCNC: 0.99 MG/DL (ref 0.55–1.3)
CRP SERPL-MCNC: 1.01 MG/DL (ref 0–0.3)
EOSINOPHIL # BLD AUTO: 0.1 10^3/UL (ref 0–0.5)
EOSINOPHIL NFR BLD AUTO: 1.1 % (ref 0–3)
ERYTHROCYTE [SEDIMENTATION RATE] IN BLOOD BY WESTERGREN METHOD: 20 MM/HR (ref 0–20)
GFR SERPL CREATININE-BSD FRML MDRD: > 60 ML/MIN/{1.73_M2} (ref 58–?)
GLUCOSE SERPL-MCNC: 101 MG/DL (ref 70–100)
HCT VFR BLD AUTO: 41.2 % (ref 36–47)
HGB BLD-MCNC: 13.6 G/DL (ref 12–15.5)
LYMPHOCYTES # BLD AUTO: 2.4 10^3/UL (ref 1.5–5)
LYMPHOCYTES NFR BLD AUTO: 34.5 % (ref 24–44)
MCH RBC QN AUTO: 30.3 PG (ref 27–33)
MCHC RBC AUTO-ENTMCNC: 33 G/DL (ref 32–36.5)
MCV RBC AUTO: 91.8 FL (ref 80–96)
MONOCYTES # BLD AUTO: 0.6 10^3/UL (ref 0–0.8)
MONOCYTES NFR BLD AUTO: 8.5 % (ref 2–8)
NEUTROPHILS # BLD AUTO: 3.9 10^3/UL (ref 1.5–8.5)
NEUTROPHILS NFR BLD AUTO: 54.7 % (ref 36–66)
PLATELET # BLD AUTO: 259 10^3/UL (ref 150–450)
POTASSIUM SERPL-SCNC: 4.2 MEQ/L (ref 3.5–5.1)
PROT SERPL-MCNC: 7.1 GM/DL (ref 6.4–8.2)
RBC # BLD AUTO: 4.49 10^6/UL (ref 4–5.4)
SODIUM SERPL-SCNC: 140 MEQ/L (ref 136–145)
WBC # BLD AUTO: 7.1 10^3/UL (ref 4–10)

## 2021-09-09 ENCOUNTER — HOSPITAL ENCOUNTER (OUTPATIENT)
Dept: HOSPITAL 53 - M LAB | Age: 46
End: 2021-09-09
Attending: INTERNAL MEDICINE
Payer: COMMERCIAL

## 2021-09-09 DIAGNOSIS — K92.2: Primary | ICD-10-CM

## 2021-09-09 LAB
BUN SERPL-MCNC: 10 MG/DL (ref 7–18)
CREAT SERPL-MCNC: 0.94 MG/DL (ref 0.55–1.3)
CRP SERPL-MCNC: 0.43 MG/DL (ref 0–0.3)
ERYTHROCYTE [SEDIMENTATION RATE] IN BLOOD BY WESTERGREN METHOD: 15 MM/HR (ref 0–20)
GFR SERPL CREATININE-BSD FRML MDRD: > 60 ML/MIN/{1.73_M2} (ref 58–?)
HCT VFR BLD AUTO: 44 % (ref 36–47)
HGB BLD-MCNC: 14.2 G/DL (ref 12–15.5)
IRON SATN MFR SERPL: 103 % (ref 13.2–45)
IRON SERPL-MCNC: 138 UG/DL (ref 50–170)
MCH RBC QN AUTO: 30 PG (ref 27–33)
MCHC RBC AUTO-ENTMCNC: 32.3 G/DL (ref 32–36.5)
MCV RBC AUTO: 93 FL (ref 80–96)
PLATELET # BLD AUTO: 251 10^3/UL (ref 150–450)
RBC # BLD AUTO: 4.73 10^6/UL (ref 4–5.4)
TIBC SERPL-MCNC: 134 UG/DL (ref 250–450)
WBC # BLD AUTO: 6.1 10^3/UL (ref 4–10)

## 2021-09-29 ENCOUNTER — HOSPITAL ENCOUNTER (OUTPATIENT)
Dept: HOSPITAL 53 - M LABSMTC | Age: 46
End: 2021-09-29
Attending: ANESTHESIOLOGY
Payer: COMMERCIAL

## 2021-09-29 DIAGNOSIS — Z01.812: Primary | ICD-10-CM

## 2021-09-29 DIAGNOSIS — Z20.822: ICD-10-CM

## 2021-10-04 ENCOUNTER — HOSPITAL ENCOUNTER (OUTPATIENT)
Dept: HOSPITAL 53 - M OPP | Age: 46
Discharge: HOME | End: 2021-10-04
Attending: INTERNAL MEDICINE
Payer: COMMERCIAL

## 2021-10-04 VITALS — BODY MASS INDEX: 26.63 KG/M2 | WEIGHT: 179.8 LBS | HEIGHT: 69 IN

## 2021-10-04 VITALS — SYSTOLIC BLOOD PRESSURE: 111 MMHG | DIASTOLIC BLOOD PRESSURE: 57 MMHG

## 2021-10-04 DIAGNOSIS — Z79.899: ICD-10-CM

## 2021-10-04 DIAGNOSIS — K52.9: Primary | ICD-10-CM

## 2021-10-04 DIAGNOSIS — K92.1: ICD-10-CM

## 2021-10-04 DIAGNOSIS — Z80.0: ICD-10-CM

## 2021-10-04 DIAGNOSIS — Z88.5: ICD-10-CM

## 2021-10-04 DIAGNOSIS — K64.8: ICD-10-CM

## 2021-10-04 PROCEDURE — 88305 TISSUE EXAM BY PATHOLOGIST: CPT

## 2021-10-04 PROCEDURE — 45380 COLONOSCOPY AND BIOPSY: CPT

## 2021-10-04 NOTE — ROOR
________________________________________________________________________________

Patient Name: Jazmín Washington           Procedure Date: 10/4/2021 11:16 AM

MRN: [id number]                          Account Number: [id number]

YOB: 1975               Age: 46

Room: [facility]                            Gender: Female

Note Status: Finalized                 

________________________________________________________________________________

 

Procedure:            Colonoscopy

Indications:          Chronic diarrhea, Hematochezia

Providers:            Efra Guaman MD

Referring MD:         Gypsy CASON DO

Requesting Provider:  Gypsy CASON DO

Medicines:            Monitored Anesthesia Care

Complications:        No immediate complications.

________________________________________________________________________________

Procedure:            Pre-Anesthesia Assessment:

                      - Prior to the procedure, a History and Physical was 

                      performed, and patient medications and allergies were 

                      reviewed. The patient is competent. The risks and 

                      benefits of the procedure and the sedation options and 

                      risks were discussed with the patient. All questions 

                      were answered and informed consent was obtained. Patient 

                      identification and proposed procedure were verified by 

                      the physician, the nurse and the anesthesiologist in the 

                      procedure room. Mental Status Examination: alert and 

                      oriented. Airway Examination: normal oropharyngeal 

                      airway and neck mobility. Respiratory Examination: clear 

                      to auscultation. CV Examination: normal. Prophylactic 

                      Antibiotics: The patient does not require prophylactic 

                      antibiotics. Prior Anticoagulants: The patient has taken 

                      no previous anticoagulant or antiplatelet agents. ASA 

                      Grade Assessment: II - A patient with mild systemic 

                      disease. After reviewing the risks and benefits, the 

                      patient was deemed in satisfactory condition to undergo 

                      the procedure. The anesthesia plan was to use monitored 

                      anesthesia care (MAC). Immediately prior to 

                      administration of medications, the patient was 

                      re-assessed for adequacy to receive sedatives. The heart 

                      rate, respiratory rate, oxygen saturations, blood 

                      pressure, adequacy of pulmonary ventilation, and 

                      response to care were monitored throughout the 

                      procedure. The physical status of the patient was 

                      re-assessed after the procedure.

                      The Colonoscope was introduced through the anus and 

                      advanced to the terminal ileum, with identification of 

                      the appendiceal orifice and IC valve. The colonoscopy 

                      was technically difficult and complex due to restricted 

                      mobility of the colon. Successful completion of the 

                      procedure was aided by withdrawing the scope and 

                      replacing with the enteroscope. The patient tolerated 

                      the procedure well. The quality of the bowel preparation 

                      was good. The terminal ileum, ileocecal valve, 

                      appendiceal orifice, and rectum were photographed. Scope 

                      insertion time was 5 minutes. Scope withdrawal time was 

                      9 minutes. The total duration of the procedure was 14 

                      minutes.

                                                                                

Findings:

     The perianal and digital rectal examinations were normal.

     The terminal ileum appeared normal.

     Normal mucosa was found in the entire colon. Biopsies for histology were 

     taken with a cold forceps from the right colon, left colon, transverse 

     colon and rectosigmoid colon for evaluation of microscopic colitis. 

     Verification of patient identification for the specimen was done by the 

     physician and nurse using the patient's name, birth date and medical 

     record number. Estimated blood loss was minimal.

     Non-bleeding external and internal hemorrhoids were found during 

     retroflexion. The hemorrhoids were medium-sized.

                                                                                

Impression:           - The examined portion of the ileum was normal.

                      - Normal mucosa in the entire examined colon. Biopsied.

                      - Non-bleeding external and internal hemorrhoids.

Recommendation:       - Patient has a contact number available for 

                      emergencies. The signs and symptoms of potential delayed 

                      complications were discussed with the patient. Return to 

                      normal activities tomorrow. Written discharge 

                      instructions were provided to the patient.

                      - High fiber diet.

                      - Continue present medications.

                      - Use fiber, for example Citrucel, Fibercon, Konsyl or 

                      Metamucil. and topic preparation H for hemorrhoids.

                      - Await pathology results.

                      - Repeat colonoscopy in 10 years for screening purposes.

                      - Telephone GI clinic for pathology results in 2 weeks.

                      - Based on the biopsies will consider anti-spasmodic 

                      therapy.

                      - Return to GI clinic if persistent symptoms or new 

                      symptoms.

                      - Return to primary care physician.

                                                                                

Procedure Code(s):    --- Professional ---

                      89109, Colonoscopy, flexible; with biopsy, single or 

                      multiple

Diagnosis Code(s):    --- Professional ---

                      K64.8, Other hemorrhoids

                      K52.9, Noninfective gastroenteritis and colitis, 

                      unspecified

                      K92.1, Melena (includes Hematochezia)

 

CPT copyright 2019 [employer]. All rights reserved.

 

The codes documented in this report are preliminary and upon  review may 

be revised to meet current compliance requirements.

 

Efra Guaman MD

_______________________

Efra Guaman MD

10/4/2021 12:17:10 PM

Electronically signed by Efra Guaman MD

Number of Addenda: 0

 

Note Initiated On: 10/4/2021 11:16 AM

Estimated Blood Loss: Estimated blood loss was minimal.

## 2022-03-02 ENCOUNTER — HOSPITAL ENCOUNTER (OUTPATIENT)
Dept: HOSPITAL 53 - M WUC | Age: 47
End: 2022-03-02
Attending: PHYSICIAN ASSISTANT
Payer: COMMERCIAL

## 2022-03-02 DIAGNOSIS — R06.02: Primary | ICD-10-CM

## 2022-03-02 LAB
BASOPHILS # BLD AUTO: 0 10^3/UL (ref 0–0.2)
BASOPHILS NFR BLD AUTO: 0.6 % (ref 0–1)
BUN SERPL-MCNC: 14 MG/DL (ref 7–18)
CALCIUM SERPL-MCNC: 9.8 MG/DL (ref 8.5–10.1)
CHLORIDE SERPL-SCNC: 105 MEQ/L (ref 98–107)
CO2 SERPL-SCNC: 31 MEQ/L (ref 21–32)
CREAT SERPL-MCNC: 1.07 MG/DL (ref 0.55–1.3)
EOSINOPHIL # BLD AUTO: 0.1 10^3/UL (ref 0–0.5)
EOSINOPHIL NFR BLD AUTO: 2 % (ref 0–3)
GFR SERPL CREATININE-BSD FRML MDRD: 58.8 ML/MIN/{1.73_M2} (ref 58–?)
GLUCOSE SERPL-MCNC: 97 MG/DL (ref 70–100)
HCT VFR BLD AUTO: 42.6 % (ref 36–47)
HGB BLD-MCNC: 13.8 G/DL (ref 12–15.5)
LYMPHOCYTES # BLD AUTO: 2.2 10^3/UL (ref 1.5–5)
LYMPHOCYTES NFR BLD AUTO: 32.3 % (ref 24–44)
MCH RBC QN AUTO: 30.1 PG (ref 27–33)
MCHC RBC AUTO-ENTMCNC: 32.4 G/DL (ref 32–36.5)
MCV RBC AUTO: 92.8 FL (ref 80–96)
MONOCYTES # BLD AUTO: 0.6 10^3/UL (ref 0–0.8)
MONOCYTES NFR BLD AUTO: 8.2 % (ref 2–8)
NEUTROPHILS # BLD AUTO: 3.9 10^3/UL (ref 1.5–8.5)
NEUTROPHILS NFR BLD AUTO: 56.6 % (ref 36–66)
NT-PRO BNP: 122 PG/ML (ref ?–125)
PLATELET # BLD AUTO: 265 10^3/UL (ref 150–450)
POTASSIUM SERPL-SCNC: 4.1 MEQ/L (ref 3.5–5.1)
RBC # BLD AUTO: 4.59 10^6/UL (ref 4–5.4)
SODIUM SERPL-SCNC: 141 MEQ/L (ref 136–145)
WBC # BLD AUTO: 6.9 10^3/UL (ref 4–10)

## 2022-03-21 ENCOUNTER — HOSPITAL ENCOUNTER (OUTPATIENT)
Dept: HOSPITAL 53 - M WUC | Age: 47
End: 2022-03-21
Attending: FAMILY MEDICINE
Payer: COMMERCIAL

## 2022-03-21 DIAGNOSIS — R07.9: Primary | ICD-10-CM

## 2022-03-24 ENCOUNTER — HOSPITAL ENCOUNTER (OUTPATIENT)
Dept: HOSPITAL 53 - M CARPUL | Age: 47
End: 2022-03-24
Attending: PHYSICIAN ASSISTANT
Payer: COMMERCIAL

## 2022-03-24 DIAGNOSIS — R06.02: ICD-10-CM

## 2022-03-24 DIAGNOSIS — R07.2: Primary | ICD-10-CM

## 2022-04-11 ENCOUNTER — HOSPITAL ENCOUNTER (OUTPATIENT)
Dept: HOSPITAL 53 - M LAB REF | Age: 47
End: 2022-04-11
Attending: FAMILY MEDICINE
Payer: COMMERCIAL

## 2022-04-11 DIAGNOSIS — R39.15: Primary | ICD-10-CM

## 2022-05-05 ENCOUNTER — HOSPITAL ENCOUNTER (OUTPATIENT)
Dept: HOSPITAL 53 - M PLAIMG | Age: 47
End: 2022-05-05
Attending: FAMILY MEDICINE
Payer: COMMERCIAL

## 2022-05-05 DIAGNOSIS — R31.9: Primary | ICD-10-CM

## 2022-09-24 ENCOUNTER — HOSPITAL ENCOUNTER (EMERGENCY)
Dept: HOSPITAL 53 - M ED | Age: 47
Discharge: HOME | End: 2022-09-24
Payer: COMMERCIAL

## 2022-09-24 VITALS — WEIGHT: 184.53 LBS | HEIGHT: 69 IN | BODY MASS INDEX: 27.33 KG/M2

## 2022-09-24 VITALS — DIASTOLIC BLOOD PRESSURE: 67 MMHG | SYSTOLIC BLOOD PRESSURE: 133 MMHG
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
## 2022-09-24 DIAGNOSIS — Z88.5: ICD-10-CM

## 2022-09-24 DIAGNOSIS — S63.501A: Primary | ICD-10-CM

## 2022-09-24 DIAGNOSIS — F32.A: ICD-10-CM

## 2022-09-24 DIAGNOSIS — Z79.899: ICD-10-CM

## 2022-09-24 DIAGNOSIS — F41.9: ICD-10-CM

## 2022-09-24 DIAGNOSIS — I10: ICD-10-CM

## 2022-09-24 DIAGNOSIS — S80.811A: ICD-10-CM

## 2022-09-27 NOTE — RO
DATE OF PROCEDURE:   02/24/2017

 

PREPROCEDURE DIAGNOSES:   Hallux valgus metatarsus prima varus deformity, left

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

 

POSTPROCEDURE DIAGNOSES:  Hallux valgus metatarsus prima varus deformity, left

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

 

PROCEDURE:  Dinah bunionectomy and internal screw fixation, 3.0 mm x 22 mm x one

and a Tailor's bunionectomy with distal V osteotomy and internal screw fixation

2.5 mm x 14 mm x one, left foot.

 

SURGEON:  Dr. Jeffy Wilson DPM

 

FIRST ASSISTANT:  None.

 

ANESTHESIA:  Local MAC.

 

IRRIGATION:  Dilute bacitracin, neomycin, and polymyxin B solution.

 

HEMOSTASIS:  Ankle pneumatic tourniquet at 200 mm of mercury for 39 minutes of

the left ankle.

 

DESCRIPTION OF PROCEDURE:

On 02/24/2017, this 41-year-old white female was taken from her hospital room to

the operating room and placed on the operating table in the supine position.

Following the induction of intravenous (IV) sedation and local and regional

anesthesia, the left lower extremity was prepped and draped in the usual aseptic

manner. The left lower extremity was elevated 45 degrees in the horizontal plane

for the purpose of preoperative exsanguination of the limb. During this 3-minute

time period, an ankle pneumatic tourniquet was applied just proximal to the

medial and lateral malleolus well-padded site. To further exsanguinate the limb,

a Kailash Esmarch bandage was placed circumferentially, extending from the digits

to the distal edge of the ankle pneumatic tourniquet. The ankle pneumatic

tourniquet was rapidly inflated to 200 mm of mercury.  Attention was directed to

the patient's left foot.  Sterile draping was completed, and the following

procedure was performed.

 

DINAH BUNIONECTOMY WITH INTERNAL SCREW FIXATION, LEFT FOOT:

Attention was directed to the patients left foot where a 5 cm incision was placed

over the first metatarsophalangeal joint medial to the extensor tendon. The

incision was deepened into subcutaneous tissues, and all coursing venous

tributaries were identified, underscored, clamped, cut, ligated and

electrocoagulated as necessary.  A linear capsulotomy was performed in the same

plane as the original skin incision.  The capsule and periosteal structures were

dissected free in one continuous layer dorsally, medially, and laterally, thus

creating a capsular periosteal-type envelope.  This delivered into view the

hypertrophied medial eminence of the first metatarsal, which was osteotomized

from distal to proximal through and through. Attention was directed down to the

first intermetatarsal space, where dissection was carried down to the level of

the fibular sesamoid.  Conjoined tendon was sharply dissected free from the

fibular sesamoid.

 

Attention was directed to the medial surface of the first metatarsal, where a

V-shaped osteotomy was performed with a long plantar and short dorsal wing. Upon

completion of this osteotomy, the capital fragment was transposed approximately

30% of the width of the shaft of the first metatarsal and fixated with a 3.0 x 22

mm Dart-Fire compression screw.  The osteotomy was noted to be stable in all

three cardinal planes.  The redundant cortical spike was the osteotomized from

dorsal to plantar through the through.  Medial surface was rasped to a smooth

contour.  The wound was flushed with copious amounts of dilute bacitracin,

neomycin, and polymyxin B solution.  Attention was directed toward closure, where

the capsular structures were coapted and maintained using #3-0 Vicryl in the

simple interrupted type fashion. Subcutaneous tissue was coapted and maintained

using #4-0 Monocryl in simple interrupted type fashion. Skin incisions were

coapted and maintained using #4-0 Prolene in continuous subcuticular- type

fashion. This was additionally enforced with Steri-Strips. Attention was directed

to the lateral surface of the foot, where the following procedure was performed.

 

TAILORS BUNIONECTOMY WITH DISTAL V OSTEOTOMY AND INTERNAL SCREW FIXATION:

Attention was directed to the patients lateral surface of the foot, where there

was noted to be a Tailors bunion deformity. At this time, a 5 cm incision was

placed in the lateral surface of the 5th metatarsophalangeal joint. The incision

was deepened through subcutaneous tissues, and all coursing venous tributaries

were identified, underscored, clamped, cut, ligated, and electrocoagulated as

necessary. A linear capsulotomy was then performed in the same plane as the

original skin incision. The capsular and periosteal structures were then

dissected free in one continuous layer, dorsally, medially, and laterally, thus

creating a capsular periosteal type envelope.  This brought into view the

hypertrophied lateral eminence of the 5th metatarsal, which was osteotomized from

dorsal to plantar through and through.  This was extirpated from the wound.  A

V-shaped osteotomy was then performed with a long plantar  wing through the

distal metaphysis of the 5th metatarsal,  was transposed 30% of the width of the

shaft of the 5th metatarsal and fixated with a 2.5 x 14 mm Dart-Fire compression

screw. The osteotomy was noted to be stable in all three cardinal planes.

Redundant cortical spike was then osteotomized from dorsal to plantar through and

through and extirpated from the wound in toto. The lateral surface was rasped to

a smooth contour. The wound was flushed with copious amounts of dilute

bacitracin, neomycin, and polymyxin B solution.

 

Attention was directed toward closure.  The capsular structures were then coapted

and maintained with #3-0 Vicryl in simple interrupted type fashion. Monocryl was

then used to close the subcutaneous tissues.  The skin was closed with #4-0

Prolene in a continuous subcutaneous type fashion. This was additionally

reinforced with Steri-Strips. Following the completion of the surgical procedure,

4 mg of dexamethasone sodium phosphate  was instilled proximal to the surgical

site. Attention was directed toward bandaging, where a sterile compressive

bandage was applied consisting of Adaptic, 4 x 4's, 4 x 4 sponge, Shameka, Kerlix,

and Coban. The ankle pneumatic tourniquet was rapidly deflated, and instantaneous

capillary filling time was noted to digits 1-5 of the patient's left foot. The

patient having apparently tolerated the surgical procedure well was taken from

the OR to the recovery room, vital signs stable, patient afebrile. Further

monitoring by the anesthesia department. All surgical specimens removed during

the operative procedure were sent to pathology for gross and microscopic

examination. Postoperative instructions given upon discharge.
None

## 2023-04-02 ENCOUNTER — HOSPITAL ENCOUNTER (EMERGENCY)
Dept: HOSPITAL 53 - M ED | Age: 48
Discharge: HOME | End: 2023-04-02
Payer: COMMERCIAL

## 2023-04-02 VITALS — SYSTOLIC BLOOD PRESSURE: 140 MMHG | DIASTOLIC BLOOD PRESSURE: 78 MMHG

## 2023-04-02 VITALS — WEIGHT: 185.39 LBS | BODY MASS INDEX: 30.89 KG/M2 | HEIGHT: 65 IN

## 2023-04-02 DIAGNOSIS — Z79.899: ICD-10-CM

## 2023-04-02 DIAGNOSIS — Z88.5: ICD-10-CM

## 2023-04-02 DIAGNOSIS — M19.011: ICD-10-CM
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
## 2023-04-02 DIAGNOSIS — S01.01XA: Primary | ICD-10-CM

## 2023-04-02 DIAGNOSIS — M25.511: ICD-10-CM

## 2023-04-02 DIAGNOSIS — E78.5: ICD-10-CM
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
## 2023-05-24 ENCOUNTER — HOSPITAL ENCOUNTER (OUTPATIENT)
Dept: HOSPITAL 53 - M SDC | Age: 48
Discharge: HOME | End: 2023-05-24
Attending: PODIATRIST
Payer: COMMERCIAL

## 2023-05-24 VITALS — DIASTOLIC BLOOD PRESSURE: 57 MMHG | SYSTOLIC BLOOD PRESSURE: 107 MMHG

## 2023-05-24 VITALS — WEIGHT: 181.1 LBS | BODY MASS INDEX: 26.82 KG/M2 | HEIGHT: 69 IN

## 2023-05-24 DIAGNOSIS — I10: ICD-10-CM

## 2023-05-24 DIAGNOSIS — I47.1: ICD-10-CM
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
## 2023-05-24 DIAGNOSIS — G43.909: ICD-10-CM

## 2023-05-24 DIAGNOSIS — F41.9: ICD-10-CM

## 2023-05-24 DIAGNOSIS — T84.84XA: ICD-10-CM

## 2023-05-24 DIAGNOSIS — D64.9: ICD-10-CM

## 2023-05-24 DIAGNOSIS — F32.A: ICD-10-CM

## 2023-05-24 DIAGNOSIS — E78.00: ICD-10-CM

## 2023-05-24 DIAGNOSIS — Z88.5: ICD-10-CM

## 2023-05-24 DIAGNOSIS — I34.1: ICD-10-CM

## 2023-05-24 DIAGNOSIS — M20.12: Primary | ICD-10-CM

## 2023-05-24 DIAGNOSIS — Z98.890: ICD-10-CM

## 2023-05-24 DIAGNOSIS — Z79.899: ICD-10-CM

## 2023-05-24 PROCEDURE — 20680 REMOVAL OF IMPLANT DEEP: CPT

## 2023-05-24 PROCEDURE — 93005 ELECTROCARDIOGRAM TRACING: CPT

## 2023-05-24 PROCEDURE — 28298 COR HLX VLGS PRX PHLX OSTEOT: CPT

## 2024-04-26 ENCOUNTER — HOSPITAL ENCOUNTER (OUTPATIENT)
Dept: HOSPITAL 53 - M PLAIMG | Age: 49
End: 2024-04-26
Attending: NURSE PRACTITIONER
Payer: COMMERCIAL

## 2024-04-26 DIAGNOSIS — M47.896: ICD-10-CM

## 2024-04-26 DIAGNOSIS — M41.80: ICD-10-CM

## 2024-04-26 DIAGNOSIS — M54.50: Primary | ICD-10-CM

## 2024-09-06 ENCOUNTER — HOSPITAL ENCOUNTER (OUTPATIENT)
Dept: HOSPITAL 53 - M WUC | Age: 49
End: 2024-09-06
Attending: NURSE PRACTITIONER
Payer: COMMERCIAL

## 2024-09-06 DIAGNOSIS — M54.2: Primary | ICD-10-CM

## 2024-09-06 DIAGNOSIS — M54.6: ICD-10-CM

## 2025-03-06 ENCOUNTER — HOSPITAL ENCOUNTER (OUTPATIENT)
Dept: HOSPITAL 53 - M CARPUL | Age: 50
End: 2025-03-06
Attending: PHYSICIAN ASSISTANT
Payer: COMMERCIAL

## 2025-03-06 DIAGNOSIS — R06.02: Primary | ICD-10-CM
